# Patient Record
Sex: FEMALE | Race: WHITE | NOT HISPANIC OR LATINO | URBAN - METROPOLITAN AREA
[De-identification: names, ages, dates, MRNs, and addresses within clinical notes are randomized per-mention and may not be internally consistent; named-entity substitution may affect disease eponyms.]

---

## 2022-10-15 ENCOUNTER — EMERGENCY (EMERGENCY)
Facility: HOSPITAL | Age: 37
LOS: 1 days | Discharge: HOME | End: 2022-10-15
Admitting: EMERGENCY MEDICINE
Payer: COMMERCIAL

## 2022-10-15 VITALS
HEART RATE: 76 BPM | RESPIRATION RATE: 17 BRPM | TEMPERATURE: 98 F | OXYGEN SATURATION: 98 % | HEIGHT: 62 IN | DIASTOLIC BLOOD PRESSURE: 79 MMHG | SYSTOLIC BLOOD PRESSURE: 114 MMHG | WEIGHT: 130.07 LBS

## 2022-10-15 PROCEDURE — 99283 EMERGENCY DEPT VISIT LOW MDM: CPT

## 2022-10-15 PROCEDURE — 99281 EMR DPT VST MAYX REQ PHY/QHP: CPT

## 2022-10-15 RX ORDER — DEXTROAMPHETAMINE SACCHARATE, AMPHETAMINE ASPARTATE, DEXTROAMPHETAMINE SULFATE AND AMPHETAMINE SULFATE 1.875; 1.875; 1.875; 1.875 MG/1; MG/1; MG/1; MG/1
1 TABLET ORAL
Qty: 3 | Refills: 0
Start: 2022-10-15 | End: 2022-10-17

## 2022-10-15 RX ORDER — DULOXETINE HYDROCHLORIDE 30 MG/1
1 CAPSULE, DELAYED RELEASE ORAL
Qty: 3 | Refills: 0
Start: 2022-10-15 | End: 2022-10-17

## 2022-10-15 NOTE — ED ADULT NURSE NOTE - BEFAST SPEECH SLURRED
CARE TEAM MEETING:
PATIENT IS PROGRESSING IN THERAPY. TENATIVE DC DATE IS 10/2/20. WILL CONTINUE
TO FOLLOW WITH PATIENT. PATIENT WILL BE RA AT NEXT MEETING. No

## 2022-10-15 NOTE — ED ADULT TRIAGE NOTE - CHIEF COMPLAINT QUOTE
pt stated " I haven't been able to take my Adderall and Duloxetine for about 2-3 days, I can't find them, i feel very weak and tired'

## 2022-10-15 NOTE — ED PROVIDER NOTE - OBJECTIVE STATEMENT
36 y/o f presents stating she lost her prescriptions for duloxetine and adderall.  Pt stating she has been feeling weak and "not myself" over the past 2 days since taking them.  Pt thinks she will be able to see her doctor this week.  Denies fever, chills, n/v/d, CP, SOB, all other ROS negative.

## 2022-10-15 NOTE — ED PROVIDER NOTE - PATIENT PORTAL LINK FT
You can access the FollowMyHealth Patient Portal offered by [facility] by registering at the following website: [email or web address]. By joining [employer]’s FollowMyHealth portal, you will also be able to view your health information using other applications (apps) compatible with our system.

## 2022-10-15 NOTE — ED ADULT NURSE NOTE - OBJECTIVE STATEMENT
patient arrived to ED c/o weakness, feeling "fogging" per patient she lost her adderall and duloxetine. denies fever, cough, shortness of breath, nausea or vomiting. denies SI, AH, VH.

## 2022-10-15 NOTE — ED PROVIDER NOTE - PROGRESS NOTE DETAILS
[facility] called, pt has filled adderall at multiple pharmacies from different providers, canceled prescription, pt informed she will need to see her provider for a new prescription.

## 2022-10-15 NOTE — ED PROVIDER NOTE - CLINICAL SUMMARY MEDICAL DECISION MAKING FREE TEXT BOX
38 y/o f presents reporting she misplaced her prescriptions for adderall and duloxetine.  Confirmed doses with pharm, will rx for 3 days until pt can see her provider.

## 2022-10-17 DIAGNOSIS — Z76.0 ENCOUNTER FOR ISSUE OF REPEAT PRESCRIPTION: ICD-10-CM

## 2022-11-20 ENCOUNTER — EMERGENCY (EMERGENCY)
Facility: HOSPITAL | Age: 37
LOS: 1 days | Discharge: HOME | End: 2022-11-20
Attending: EMERGENCY MEDICINE | Admitting: EMERGENCY MEDICINE
Payer: COMMERCIAL

## 2022-11-20 VITALS
TEMPERATURE: 98 F | SYSTOLIC BLOOD PRESSURE: 141 MMHG | WEIGHT: 130.07 LBS | OXYGEN SATURATION: 99 % | DIASTOLIC BLOOD PRESSURE: 92 MMHG | HEART RATE: 104 BPM | HEIGHT: 62 IN | RESPIRATION RATE: 16 BRPM

## 2022-11-20 VITALS
HEART RATE: 87 BPM | RESPIRATION RATE: 18 BRPM | OXYGEN SATURATION: 99 % | SYSTOLIC BLOOD PRESSURE: 115 MMHG | DIASTOLIC BLOOD PRESSURE: 67 MMHG | TEMPERATURE: 98 F

## 2022-11-20 DIAGNOSIS — F90.9 ATTENTION-DEFICIT HYPERACTIVITY DISORDER, UNSPECIFIED TYPE: ICD-10-CM

## 2022-11-20 DIAGNOSIS — T43.621A POISONING BY AMPHETAMINES, ACCIDENTAL (UNINTENTIONAL), INITIAL ENCOUNTER: ICD-10-CM
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
## 2022-11-20 LAB
ALBUMIN SERPL ELPH-MCNC: 4.8 G/DL — SIGNIFICANT CHANGE UP (ref 3.3–5)
ALP SERPL-CCNC: 100 U/L — SIGNIFICANT CHANGE UP (ref 40–120)
ALT FLD-CCNC: 72 U/L — HIGH (ref 10–45)
ANION GAP SERPL CALC-SCNC: 12 MMOL/L — SIGNIFICANT CHANGE UP (ref 5–17)
APAP SERPL-MCNC: <5 UG/ML — LOW (ref 10–30)
APPEARANCE UR: CLEAR — SIGNIFICANT CHANGE UP
AST SERPL-CCNC: 90 U/L — HIGH (ref 10–40)
BASOPHILS # BLD AUTO: 0.03 K/UL — SIGNIFICANT CHANGE UP (ref 0–0.2)
BASOPHILS NFR BLD AUTO: 0.5 % — SIGNIFICANT CHANGE UP (ref 0–2)
BILIRUB SERPL-MCNC: 0.2 MG/DL — SIGNIFICANT CHANGE UP (ref 0.2–1.2)
BILIRUB UR-MCNC: NEGATIVE — SIGNIFICANT CHANGE UP
BUN SERPL-MCNC: 6 MG/DL — LOW (ref 7–23)
CALCIUM SERPL-MCNC: 9.8 MG/DL — SIGNIFICANT CHANGE UP (ref 8.4–10.5)
CHLORIDE SERPL-SCNC: 102 MMOL/L — SIGNIFICANT CHANGE UP (ref 96–108)
CO2 SERPL-SCNC: 25 MMOL/L — SIGNIFICANT CHANGE UP (ref 22–31)
COLOR SPEC: YELLOW — SIGNIFICANT CHANGE UP
CREAT SERPL-MCNC: 0.52 MG/DL — SIGNIFICANT CHANGE UP (ref 0.5–1.3)
DIFF PNL FLD: NEGATIVE — SIGNIFICANT CHANGE UP
EGFR: 124 ML/MIN/1.73M2 — SIGNIFICANT CHANGE UP
EOSINOPHIL # BLD AUTO: 0.04 K/UL — SIGNIFICANT CHANGE UP (ref 0–0.5)
EOSINOPHIL NFR BLD AUTO: 0.7 % — SIGNIFICANT CHANGE UP (ref 0–6)
ETHANOL SERPL-MCNC: 104 MG/DL — HIGH (ref 0–10)
GLUCOSE SERPL-MCNC: 101 MG/DL — HIGH (ref 70–99)
GLUCOSE UR QL: NEGATIVE — SIGNIFICANT CHANGE UP
HCG SERPL-ACNC: <0 MIU/ML — SIGNIFICANT CHANGE UP
HCT VFR BLD CALC: 39 % — SIGNIFICANT CHANGE UP (ref 34.5–45)
HGB BLD-MCNC: 13.5 G/DL — SIGNIFICANT CHANGE UP (ref 11.5–15.5)
IMM GRANULOCYTES NFR BLD AUTO: 0.2 % — SIGNIFICANT CHANGE UP (ref 0–0.9)
KETONES UR-MCNC: NEGATIVE — SIGNIFICANT CHANGE UP
LEUKOCYTE ESTERASE UR-ACNC: NEGATIVE — SIGNIFICANT CHANGE UP
LYMPHOCYTES # BLD AUTO: 2.23 K/UL — SIGNIFICANT CHANGE UP (ref 1–3.3)
LYMPHOCYTES # BLD AUTO: 36.5 % — SIGNIFICANT CHANGE UP (ref 13–44)
MAGNESIUM SERPL-MCNC: 2.1 MG/DL — SIGNIFICANT CHANGE UP (ref 1.6–2.6)
MCHC RBC-ENTMCNC: 31.3 PG — SIGNIFICANT CHANGE UP (ref 27–34)
MCHC RBC-ENTMCNC: 34.6 GM/DL — SIGNIFICANT CHANGE UP (ref 32–36)
MCV RBC AUTO: 90.5 FL — SIGNIFICANT CHANGE UP (ref 80–100)
MONOCYTES # BLD AUTO: 0.65 K/UL — SIGNIFICANT CHANGE UP (ref 0–0.9)
MONOCYTES NFR BLD AUTO: 10.6 % — SIGNIFICANT CHANGE UP (ref 2–14)
NEUTROPHILS # BLD AUTO: 3.15 K/UL — SIGNIFICANT CHANGE UP (ref 1.8–7.4)
NEUTROPHILS NFR BLD AUTO: 51.5 % — SIGNIFICANT CHANGE UP (ref 43–77)
NITRITE UR-MCNC: NEGATIVE — SIGNIFICANT CHANGE UP
NRBC # BLD: 0 /100 WBCS — SIGNIFICANT CHANGE UP (ref 0–0)
PH UR: 6 — SIGNIFICANT CHANGE UP (ref 5–8)
PHOSPHATE SERPL-MCNC: 2.4 MG/DL — LOW (ref 2.5–4.5)
PLATELET # BLD AUTO: 336 K/UL — SIGNIFICANT CHANGE UP (ref 150–400)
POTASSIUM SERPL-MCNC: 4.2 MMOL/L — SIGNIFICANT CHANGE UP (ref 3.5–5.3)
POTASSIUM SERPL-SCNC: 4.2 MMOL/L — SIGNIFICANT CHANGE UP (ref 3.5–5.3)
PROT SERPL-MCNC: 7.8 G/DL — SIGNIFICANT CHANGE UP (ref 6–8.3)
PROT UR-MCNC: NEGATIVE MG/DL — SIGNIFICANT CHANGE UP
RBC # BLD: 4.31 M/UL — SIGNIFICANT CHANGE UP (ref 3.8–5.2)
RBC # FLD: 11.7 % — SIGNIFICANT CHANGE UP (ref 10.3–14.5)
SALICYLATES SERPL-MCNC: <0.3 MG/DL — LOW (ref 2.8–20)
SODIUM SERPL-SCNC: 139 MMOL/L — SIGNIFICANT CHANGE UP (ref 135–145)
SP GR SPEC: <=1.005 — SIGNIFICANT CHANGE UP (ref 1–1.03)
UROBILINOGEN FLD QL: 0.2 E.U./DL — SIGNIFICANT CHANGE UP
WBC # BLD: 6.11 K/UL — SIGNIFICANT CHANGE UP (ref 3.8–10.5)
WBC # FLD AUTO: 6.11 K/UL — SIGNIFICANT CHANGE UP (ref 3.8–10.5)

## 2022-11-20 PROCEDURE — 81003 URINALYSIS AUTO W/O SCOPE: CPT

## 2022-11-20 PROCEDURE — 83735 ASSAY OF MAGNESIUM: CPT

## 2022-11-20 PROCEDURE — 80053 COMPREHEN METABOLIC PANEL: CPT

## 2022-11-20 PROCEDURE — 93005 ELECTROCARDIOGRAM TRACING: CPT

## 2022-11-20 PROCEDURE — 84100 ASSAY OF PHOSPHORUS: CPT

## 2022-11-20 PROCEDURE — 96374 THER/PROPH/DIAG INJ IV PUSH: CPT

## 2022-11-20 PROCEDURE — 80307 DRUG TEST PRSMV CHEM ANLYZR: CPT

## 2022-11-20 PROCEDURE — 85025 COMPLETE CBC W/AUTO DIFF WBC: CPT

## 2022-11-20 PROCEDURE — 99284 EMERGENCY DEPT VISIT MOD MDM: CPT

## 2022-11-20 PROCEDURE — 96361 HYDRATE IV INFUSION ADD-ON: CPT

## 2022-11-20 PROCEDURE — 93010 ELECTROCARDIOGRAM REPORT: CPT

## 2022-11-20 PROCEDURE — 84702 CHORIONIC GONADOTROPIN TEST: CPT

## 2022-11-20 PROCEDURE — 36415 COLL VENOUS BLD VENIPUNCTURE: CPT

## 2022-11-20 PROCEDURE — 99285 EMERGENCY DEPT VISIT HI MDM: CPT | Mod: 25

## 2022-11-20 RX ORDER — SODIUM CHLORIDE 9 MG/ML
1000 INJECTION, SOLUTION INTRAVENOUS
Refills: 0 | Status: DISCONTINUED | OUTPATIENT
Start: 2022-11-20 | End: 2022-11-24

## 2022-11-20 RX ORDER — THIAMINE MONONITRATE (VIT B1) 100 MG
100 TABLET ORAL ONCE
Refills: 0 | Status: COMPLETED | OUTPATIENT
Start: 2022-11-20 | End: 2022-11-20

## 2022-11-20 RX ORDER — SODIUM CHLORIDE 9 MG/ML
1000 INJECTION INTRAMUSCULAR; INTRAVENOUS; SUBCUTANEOUS ONCE
Refills: 0 | Status: COMPLETED | OUTPATIENT
Start: 2022-11-20 | End: 2022-11-20

## 2022-11-20 RX ADMIN — SODIUM CHLORIDE 1000 MILLILITER(S): 9 INJECTION INTRAMUSCULAR; INTRAVENOUS; SUBCUTANEOUS at 15:14

## 2022-11-20 RX ADMIN — Medication 2 MILLIGRAM(S): at 15:15

## 2022-11-20 RX ADMIN — SODIUM CHLORIDE 1000 MILLILITER(S): 9 INJECTION INTRAMUSCULAR; INTRAVENOUS; SUBCUTANEOUS at 16:13

## 2022-11-20 RX ADMIN — Medication 100 MILLIGRAM(S): at 15:15

## 2022-11-20 NOTE — ED ADULT NURSE NOTE - OBJECTIVE STATEMENT
pt received into spot 3 A&Ox4 ambulatory appears comfortable but anxious BIBA c/o anxiety palpitations after a night of drinking alcohol and taking Adderall. denies SI/HI A/V hallucinations no other drugs. respirations unlabored abd nondistended sinus tach to ccm abd nondistended no vomiting fever chills cough. no le swelling iv placed labs sent

## 2022-11-20 NOTE — ED PROVIDER NOTE - PATIENT PORTAL LINK FT
You can access the FollowMyHealth Patient Portal offered by [facility] by registering at the following website: [email or web address]. By joining [employer]’s FollowMyHealth portal, you will also be able to view your health information using other applications (apps) compatible with our system.

## 2022-11-20 NOTE — ED PROVIDER NOTE - CLINICAL SUMMARY MEDICAL DECISION MAKING FREE TEXT BOX
Ss noted above.  Pt anxious and tearful otherwise unremarkable exam.  Not concerning for over-stimulation from Adderall at this time.  Plan check labs, benzos, observe for signs of intox as ETOH levels normalize vs ETOH w/d.  Give IVF, oral ativan and reassess.  Prior chart noted.  No sign of trauma.

## 2022-11-20 NOTE — ED PROVIDER NOTE - NSFOLLOWUPINSTRUCTIONS_ED_ALL_ED_FT
Follow up with your primary doctor and therapist this week 1-2 days.  Try and be more responsible with your medications and stop drinking alcohol.      Anxiety    Generalized anxiety disorder (BULMARO) is a mental disorder. It is defined as anxiety that is not necessarily related to specific events or activities or is out of proportion to said events. Symptoms include restlessness, fatigue, difficulty concentrations, irritability and difficulty concentrating. It may interfere with life functions, including relationships, work, and school. If you were started on a medication, make sure to take exactly as prescribed and follow up with a psychiatrist.    SEEK IMMEDIATE MEDICAL CARE IF YOU HAVE ANY OF THE FOLLOWING SYMPTOMS: thoughts about hurting killing yourself, thoughts about hurting or killing somebody else, hallucinations, or worsening depression.     ----      Using Medicines Safely      Medicines can keep you healthy, but it is important to use them correctly and carefully. If you do not take your medicines as told by your health care provider, you can harm your health. This applies not only to prescription medicines but also to over-the-counter medicines, vitamins, supplements, and herbal remedies.  •Take over-the-counter and prescription medicines only as told by your health care provider.      •At each appointment, talk to your health care provider about all of the medicines you take.        Things you must know about your medicine    Make sure you know:•Basic information about your medicine, such as:  •The name. This includes all brand names and generic names.      •Why you are taking it.      •How much you can take.      •How often you can take it.      •How to take your medicine, including:  •If you need to take it before, with, or after meals.      •What foods you should avoid.      •If you need to avoid alcohol.      •What to do if you miss a dose.      •If it interacts with any other medicines.      •Possible side effects and what to do if you have any. This includes:  •Signs of an allergic reaction.      •When to call your health care provider.        Ask your health care provider or pharmacist:  •How long it will take for your medicine to start working.      •If you will need refills or not.      •If you can take a generic version instead of the brand name.      •If you can take the medicine if you are pregnant or breastfeeding.      •About other options to treat your condition instead of medicine.        What actions can I take to use medicines carefully?    Things to avoid     • Do not share your medicine with anyone else.      • Do not take anyone else's medicine.      • Do not split, mash, or chew your medicine unless your health care provider or pharmacist says you can. Tell your health care provider if you are having problems swallowing your medicine.      • Do not take medicine in the dark. Make sure you can see which medicines you are taking.      • Do not stop taking your medicines without first contacting your health care provider.        Things to do at the pharmacy      •Let your pharmacist know if the information on the label has changed, or if the size, shape, or color of the medicine looks different.      •Make sure you can read the label and open your medicine container. If you have problems, tell your pharmacist.        Tracking your medicine use      •Track how you use your medicine by writing down when you take it. Download a tracking worksheet at this website: www.mal.nih.gov/health/tracking-your-medications-worksheet      •Use the same pharmacy as much as possible. They will know you and your medicine history.      •Organize your medicines. If you use a pill box, keep the original medicine container and information. You may want to keep a file for medicine information.      •Set an alarm to make sure you take your medicines on time. Try using one on your mobile device or smartphone.      •Give a copy of your medicine information to a trusted friend or family member.      •Track your refill dates and expiration dates. Try using one on your mobile device or smartphone.      Storing your medicine     •Store your medicines in a cool, dry, and dark place. Do not store medicine in your bathroom because they are often moist and humid.      •Keep medicines out of reach of children and pets.      When traveling     •Bring all your medicine information and contact numbers.       •Refill your prescription medicines before you leave, if needed.      •If you are traveling by plane, keep your medicines in your carry-on bag.      •Bring extra medicine with you in case your travel plans change.      General tips     •Before you take your medicine, read labels and instructions carefully.      •Throw away old or unused medicines safely. To learn more, go to www.fda.gov and search "safe disposal of medicine."        What can happen if these actions are not taken?    •You may be at greater risk for side effects and harm from your medicine.      •Your medicine may not work as it is supposed to.      •Your condition may not get better or it may get worse.      •You may have complications from your condition.      •Your medicine may react with other medicines you take.      •You may have additional medical expenses if you experience side effects or take longer to get better.      •Other people may be harmed if they take your medicine.      •You could become dependent and suffer withdrawal symptoms if you do not take prescription pain medicine as told by your health care provider.        Where to find more information    U.S. Food and Drug Administration, Use Medicines Wisely: https://www.fda.gov    [place] Department of Health and Human Services, Use Medicines Safely: https://healthfinder.gov    Choosing Wisely, Taking Medicines Safely: http://www.choosingwisely.org      Contact a health care provider if:    •You have questions about your medicine.      •You are ill and not able to take your medicine.      •You have side effects from your medicine.      •You miss a dose or doses of your medicine.        Get help right away if:    •You have symptoms of an allergic reaction to your medicine.      •You think that you or someone else may have taken too much medicine (overdose). The hotline of the National Poison Control Center is (788) 901-7556.      Medication allergy or overdose is an emergency. Do not wait to see if the symptoms will go away. Get medical help right away. Call your local emergency services (098 in the [place] Do not drive yourself to the hospital.       Summary    •Taking medicines is sometimes necessary to keep you healthy, but it is important to use them correctly and carefully.      •Read medicine labels and instructions carefully.      •Talk to your health care provider about all the medicines you are taking.      •If you do not use your medicine correctly, you may be at higher risk for side effects and other dangerous health problems. Your medicine may not work as planned, or your condition may not improve or get worse.      •Contact your health care provider if you have questions about your medicines, or if you experience any side effects.      This information is not intended to replace advice given to you by your health care provider. Make sure you discuss any questions you have with your health care provider.

## 2022-11-20 NOTE — ED PROVIDER NOTE - PROGRESS NOTE DETAILS
pt walking with steady gait, aox3, fully aware of surroundings, ETOH level noted.  Pt wants discharge now.  Educated on ETOH abuse risks and healthy practices.  Will dc and rec outpt fu.

## 2022-11-20 NOTE — ED ADULT TRIAGE NOTE - OTHER COMPLAINTS
reports taking 8 tabs of adderall (20 mg) last night. Denies SI/HI. Reports feeling anxious with new job, was drinking alcohol and took adderall one by one, last adderall taken at 1 am. Tearful upon arrival, ambulatory. No CP, no dizziness or other c/o
